# Patient Record
(demographics unavailable — no encounter records)

---

## 2017-01-05 NOTE — ULTRASOUND REPORT
TRANSABDOMINAL AND TRANSVAGINAL PELVIC ULTRASOUND: 01/05/17 09:06:00



CLINICAL: Pelvic and perineal pain.







FINDINGS: Transabdominal and transvaginal pelvic ultrasound 

demonstrated a normal small uterus measuring 7.9 x 3.3 x 5.1 cm.  

Normal uterine contour and echogenicity.  The endometrium is normal and 

measures 5.3 mm AP thickness.



Normal right ovary with small follicles. The right ovary measures 3.0 x 

2.6 x 2.7cm. A left ovary was not identified. 



No adnexal mass.  No free fluid. Normal urinary bladder.



IMPRESSION: Normal uterus and right ovary.  No left ovary identified.